# Patient Record
Sex: MALE | Employment: FULL TIME | URBAN - METROPOLITAN AREA
[De-identification: names, ages, dates, MRNs, and addresses within clinical notes are randomized per-mention and may not be internally consistent; named-entity substitution may affect disease eponyms.]

---

## 2020-12-18 ENCOUNTER — OFFICE VISIT (OUTPATIENT)
Dept: ENDOCRINOLOGY | Age: 50
End: 2020-12-18
Payer: COMMERCIAL

## 2020-12-18 ENCOUNTER — TELEPHONE (OUTPATIENT)
Dept: ENDOCRINOLOGY | Age: 50
End: 2020-12-18

## 2020-12-18 VITALS
WEIGHT: 242.3 LBS | TEMPERATURE: 98.9 F | OXYGEN SATURATION: 97 % | DIASTOLIC BLOOD PRESSURE: 91 MMHG | HEART RATE: 78 BPM | SYSTOLIC BLOOD PRESSURE: 140 MMHG

## 2020-12-18 DIAGNOSIS — B35.3 TINEA PEDIS OF BOTH FEET: ICD-10-CM

## 2020-12-18 DIAGNOSIS — E11.65 TYPE 2 DIABETES MELLITUS WITH HYPERGLYCEMIA, WITHOUT LONG-TERM CURRENT USE OF INSULIN (HCC): Primary | ICD-10-CM

## 2020-12-18 PROBLEM — E78.2 MIXED HYPERLIPIDEMIA: Status: ACTIVE | Noted: 2020-12-18

## 2020-12-18 PROBLEM — I10 BENIGN ESSENTIAL HTN: Status: ACTIVE | Noted: 2020-12-18

## 2020-12-18 LAB
BILIRUB UR QL STRIP: NEGATIVE
GLUCOSE POC: 368 MG/DL
GLUCOSE UR-MCNC: NORMAL MG/DL
HBA1C MFR BLD HPLC: 14 %
KETONES P FAST UR STRIP-MCNC: NEGATIVE MG/DL
PH UR STRIP: 6 [PH] (ref 4.6–8)
PROT UR QL STRIP: NEGATIVE
SP GR UR STRIP: 1.01 (ref 1–1.03)
UA UROBILINOGEN AMB POC: NORMAL (ref 0.2–1)
URINALYSIS CLARITY POC: CLEAR
URINALYSIS COLOR POC: YELLOW
URINE BLOOD POC: NEGATIVE
URINE LEUKOCYTES POC: NEGATIVE
URINE NITRITES POC: NEGATIVE

## 2020-12-18 PROCEDURE — 82962 GLUCOSE BLOOD TEST: CPT | Performed by: INTERNAL MEDICINE

## 2020-12-18 PROCEDURE — 99204 OFFICE O/P NEW MOD 45 MIN: CPT | Performed by: INTERNAL MEDICINE

## 2020-12-18 PROCEDURE — 81002 URINALYSIS NONAUTO W/O SCOPE: CPT | Performed by: INTERNAL MEDICINE

## 2020-12-18 PROCEDURE — 83036 HEMOGLOBIN GLYCOSYLATED A1C: CPT | Performed by: INTERNAL MEDICINE

## 2020-12-18 RX ORDER — METFORMIN HYDROCHLORIDE 500 MG/1
TABLET, FILM COATED, EXTENDED RELEASE ORAL
Qty: 60 TAB | Refills: 3 | Status: SHIPPED | OUTPATIENT
Start: 2020-12-18 | End: 2021-03-15

## 2020-12-18 RX ORDER — METFORMIN HYDROCHLORIDE 1000 MG/1
1000 TABLET ORAL 2 TIMES DAILY WITH MEALS
Qty: 60 TAB | Refills: 3 | Status: SHIPPED | OUTPATIENT
Start: 2020-12-18 | End: 2020-12-18 | Stop reason: ALTCHOICE

## 2020-12-18 RX ORDER — INSULIN PUMP SYRINGE, 3 ML
EACH MISCELLANEOUS
Qty: 1 KIT | Refills: 0 | Status: SHIPPED | OUTPATIENT
Start: 2020-12-18

## 2020-12-18 RX ORDER — ONDANSETRON 4 MG/1
4 TABLET, FILM COATED ORAL
COMMUNITY
End: 2021-02-16 | Stop reason: ALTCHOICE

## 2020-12-18 RX ORDER — LANCETS
EACH MISCELLANEOUS
Qty: 100 EACH | Refills: 5 | Status: SHIPPED | OUTPATIENT
Start: 2020-12-18

## 2020-12-18 RX ORDER — PIOGLITAZONEHYDROCHLORIDE 45 MG/1
45 TABLET ORAL DAILY
Qty: 30 TAB | Refills: 3 | Status: SHIPPED | OUTPATIENT
Start: 2020-12-18 | End: 2021-12-30 | Stop reason: SDUPTHER

## 2020-12-18 RX ORDER — PEN NEEDLE, DIABETIC 30 GX3/16"
NEEDLE, DISPOSABLE MISCELLANEOUS
Qty: 1 PACKAGE | Refills: 3 | Status: SHIPPED | OUTPATIENT
Start: 2020-12-18 | End: 2022-01-11 | Stop reason: SDUPTHER

## 2020-12-18 RX ORDER — INSULIN GLARGINE 100 [IU]/ML
INJECTION, SOLUTION SUBCUTANEOUS
Qty: 2 ADJUSTABLE DOSE PRE-FILLED PEN SYRINGE | Refills: 3 | Status: SHIPPED | OUTPATIENT
Start: 2020-12-18 | End: 2020-12-22 | Stop reason: ALTCHOICE

## 2020-12-18 RX ORDER — METFORMIN HYDROCHLORIDE 500 MG/1
500 TABLET ORAL 2 TIMES DAILY WITH MEALS
COMMUNITY
End: 2020-12-18 | Stop reason: DRUGHIGH

## 2020-12-18 NOTE — LETTER
12/18/2020 Patient: Linda Dubois YOB: 1970 Date of Visit: 12/18/2020 Leonardo Portillo MD 
[id number] [facility] [address] Via Fax: [phone] Dear Leonardo Portillo MD, Thank you for referring Mr. Alexandra Vazquez to [address] [place] [address] for evaluation. My notes for this consultation are attached. If you have questions, please do not hesitate to call me. I look forward to following your patient along with you. Sincerely, Christine Ramirez MD

## 2020-12-18 NOTE — TELEPHONE ENCOUNTER
Called and spoke to patient's mother. 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

## 2020-12-18 NOTE — TELEPHONE ENCOUNTER
Patient called in requesting to see if you could call and  talk to his mother and let her know what is going on with him because he couldn't explain it to her for her to understand exactly what is going on and he think he left out some information.  His mother's name is Moreno Hdez and her number is [phone]

## 2020-12-18 NOTE — PROGRESS NOTES
History and Physical    Patient: Whit Herbert MRN: [id number]  SSN: [id number]    YOB: 1970  Age: 44 y.o. Sex: male      Subjective:      Whit Herbert is a 44 y.o. male medical history of hypertension, hyperlipidemia is sent to me by primary care physician Dr. German Coleman for type 2 diabetes mellitus. Patient was diagnosed with diabetes a long time back. He is on metformin 1000 mg twice a day. He skips it here and there because it gives him diarrhea. He eats 2 meals per day, snacks on sweets, sometimes drinks soda and sweet tea. Does not do any structured exercise. Physically active at work. He has not had diabetic eye exam in a long time. He is having polyuria, polydipsia, unintentional weight loss. He has not been checking blood glucose at home and he needs a new glucometer. Glucometer reading: Does not check blood glucose at home    · Diagnosis: long time  · Current treatment: metformin 100 mg bid  · Past treatment: no  · Glucose checks: not checking   · Hyperglycemia: yes  · Hypoglycemia: no  · Meals per day: 2, breakfast: hardy's, lunch: skips, dinner: baked chicken, snacks: sweets, sod, sweet tea  · Exercise: walks at work  · DM related hospitalizations: no    Complications of DM:  · CAD: no, a.fib no cardiologist  · CVA: no  · PVD: no  · Amputations: no   · Retinopathy: no; last exam was 2 years back  · Gastropathy: no  · Nephropathy: no  · Neuropathy: no    Medications:  · Statin:  No  · ACE-I:  No  · ASA:  Yes    · Diabetes education: No      Past Medical History:   Diagnosis Date    [facility]      Past Surgical History:   Procedure Laterality Date    HX APPENDECTOMY        Family History   Family history unknown: Yes     Social History     Tobacco Use    Smoking status: Never Smoker    Smokeless tobacco: Never Used   Substance Use Topics    Alcohol use: Not on file      Prior to Admission medications    Medication Sig Start Date End Date Taking? Authorizing Provider   AMLODIPINE BESYLATE, BULK, 10 mg by Does Not Apply route daily. Yes Provider, Historical   ondansetron hcl (ZOFRAN) 4 mg tablet Take 4 mg by mouth every eight (8) hours as needed for Nausea or Vomiting. Yes Provider, Historical   insulin glargine (LANTUS,BASAGLAR) 100 unit/mL (3 mL) inpn Inject 15 units at bedtime 12/18/20  Yes Rachell Campo MD   pioglitazone (ACTOS) 45 mg tablet Take 1 Tab by mouth daily for 30 days. 12/18/20 1/17/21 Yes Rachell Campo MD   Insulin Needles, Disposable, 31 gauge x 5/16\" ndle Once a day 12/18/20  Yes Rachell Campo MD   Blood-Glucose Meter monitoring kit Check glucose twice a day 12/18/20  Yes Rachell Campo MD   glucose blood VI test strips (ASCENSIA AUTODISC VI, ONE TOUCH ULTRA TEST VI) strip Check glucose twice a day 12/18/20  Yes Rachell Campo MD   lancets misc Check glucose twice a day 12/18/20  Yes Rachell Campo MD   [facility]) 500 mg TG24 24 hour tablet Take 2 tabs with dinner 12/18/20  Yes Rachell Campo MD        No Known Allergies    Review of Systems:  ROS    A comprehensive review of systems was preformed and it is negative except mentioned in HPI    Objective:     Vitals:    12/18/20 1123   BP: (!) 140/91   Pulse: 78   Temp: 98.9 °F (37.2 °C)   SpO2: 97%   Weight: 242 lb 4.8 oz (109.9 kg)        Physical Exam:    Physical Exam  Vitals signs and nursing note reviewed. Constitutional:       Appearance: Normal appearance. HENT:      Head: Normocephalic and atraumatic. Eyes:      Extraocular Movements: Extraocular movements intact. Pupils: Pupils are equal, round, and reactive to light. Neck:      Musculoskeletal: Neck supple. Cardiovascular:      Rate and Rhythm: Normal rate and regular rhythm. Pulmonary:      Effort: Pulmonary effort is normal.      Breath sounds: Normal breath sounds. Abdominal:      General: Bowel sounds are normal.      Palpations: Abdomen is soft. Musculoskeletal: Normal range of motion. General: No swelling. Skin:     General: Skin is warm and dry. Neurological:      General: No focal deficit present. Mental Status: He is alert and oriented to person, place, and time. Psychiatric:         Mood and Affect: Mood normal.         Behavior: Behavior normal.       diabetic foot exam:  Bilateral diabetic foot exam was performed today. Dorsalis pedis pulses 2+ bilaterally. Monofilament sensation normal bilaterally. No ulcers or skin breakdown. Labs and Imaging:    Last 3 Recorded Weights in this Encounter    12/18/20 1123   Weight: 242 lb 4.8 oz (109.9 kg)        No results found for: HBA1C, HGBE8, JAN6NVTA, QHS7CEBN, [id number]     Assessment:     Patient Active Problem List   Diagnosis Code    Type 2 diabetes mellitus with hyperglycemia, without long-term current use of insulin ([facility]) E11.65    Tinea pedis of both feet B35.3    Benign essential HTN I10    Mixed hyperlipidemia E78.2           Plan:     Type 2 diabetes mellitus, uncontrolled:  I reviewed progress note and labs from the referring provider's office. Hemoglobin A1c is more than 14% today. Fingerstick blood glucose is 368 mg/dL in my office today. Urine negative for ketones  Up to date with diabetes related annual labs: no  Up to date with diabetic eye exam: no  Plan:  Discussed with patient at length about importance of controlling diabetes to prevent endorgan damage. Discussed about diabetic diet, decreasing starch intake, avoiding sugary beverages, healthy snacking. I will start him on Lantus 15 units at bedtime. Start pioglitazone 45 mg daily. Switch from regular Metformin to Metformin  mg 2 tablets with dinner only. Check blood glucose twice a day and I will see him back in my office in 2 months with his glucometer. Refer patient for diabetic eye exam.    Essential hypertension:  Blood pressure is elevated today as patient did not take amlodipine today.   Check urine microalbumin/creatinine ratio today.    Mixed hyperlipidemia:  Patient is not on any cholesterol medications. Labs from December 2019 showed hypercholesterolemia. Check lipid profile today. Tinea pedis:  Refer patient to podiatry. Orders Placed This Encounter    LIPID PANEL    METABOLIC PANEL, COMPREHENSIVE    MICROALBUMIN, UR, RAND W/ MICROALB/CREAT RATIO    TSH RFX ON ABNORMAL TO FREE T4    REFERRAL TO OPHTHALMOLOGY     Referral Priority:   Routine     Referral Type:   Consultation     Referral Reason:   Specialty Services Required     Referred to Provider:   Iva Palumbo MD     Number of Visits Requested:   1    REFERRAL TO PODIATRY     Referral Priority:   Routine     Referral Type:   Consultation     Referral Reason:   Specialty Services Required     Referred to Provider:   Kimberly Hernandez DPM     Number of Visits Requested:   1    AMB POC GLUCOSE BLOOD, BY GLUCOSE MONITORING DEVICE    AMB POC HEMOGLOBIN A1C    AMB POC URINALYSIS DIP STICK MANUAL W/O MICRO    insulin glargine (LANTUS,BASAGLAR) 100 unit/mL (3 mL) inpn     Sig: Inject 15 units at bedtime     Dispense:  2 Adjustable Dose Pre-filled Pen Syringe     Refill:  3    DISCONTD: metFORMIN (GLUCOPHAGE) 1,000 mg tablet     Sig: Take 1 Tab by mouth two (2) times daily (with meals) for 30 days. Dispense:  60 Tab     Refill:  3    pioglitazone (ACTOS) 45 mg tablet     Sig: Take 1 Tab by mouth daily for 30 days.      Dispense:  30 Tab     Refill:  3    Insulin Needles, Disposable, 31 gauge x 5/16\" ndle     Sig: Once a day     Dispense:  1 Package     Refill:  3    Blood-Glucose Meter monitoring kit     Sig: Check glucose twice a day     Dispense:  1 Kit     Refill:  0    glucose blood VI test strips (ASCENSIA AUTODISC VI, ONE TOUCH ULTRA TEST VI) strip     Sig: Check glucose twice a day     Dispense:  100 Strip     Refill:  3    lancets misc     Sig: Check glucose twice a day     Dispense:  100 Each     Refill:  5    metFORMIN (GLUMETZA ER) 500 mg TG24 24 hour tablet     Sig: Take 2 tabs with dinner     Dispense:  60 Tab     Refill:  3     Please DC metformin 100 mg bid prescription sent earlier        Signed By: Brenna Damon MD     December 18, 2020      Return to clinic 2 months

## 2020-12-18 NOTE — PATIENT INSTRUCTIONS
Continue metformin 1000 mg twice a day with food. Start Pioglitazone 45 mg daily in the morning. Start Insulin Lantus 15 units at bedtime. Check glucose twice a day: before breakfast and before dinner. Labs today. Bring meter to next visit.

## 2020-12-19 LAB
ALBUMIN SERPL-MCNC: 4.1 G/DL (ref 3.8–4.9)
ALBUMIN/CREAT UR: 82 MG/G CREAT (ref 0–29)
ALBUMIN/GLOB SERPL: 1.1 {RATIO} (ref 1.2–2.2)
ALP SERPL-CCNC: 115 IU/L (ref 39–117)
ALT SERPL-CCNC: 24 IU/L (ref 0–44)
AST SERPL-CCNC: 22 IU/L (ref 0–40)
BILIRUB SERPL-MCNC: 0.5 MG/DL (ref 0–1.2)
BUN SERPL-MCNC: 8 MG/DL (ref 6–24)
BUN/CREAT SERPL: 9 (ref 9–20)
CALCIUM SERPL-MCNC: 9.5 MG/DL (ref 8.7–10.2)
CHLORIDE SERPL-SCNC: 98 MMOL/L (ref 96–106)
CHOLEST SERPL-MCNC: 202 MG/DL (ref 100–199)
CO2 SERPL-SCNC: 23 MMOL/L (ref 20–29)
CREAT SERPL-MCNC: 0.85 MG/DL (ref 0.76–1.27)
CREAT UR-MCNC: 68.8 MG/DL
GLOBULIN SER CALC-MCNC: 3.7 G/DL (ref 1.5–4.5)
GLUCOSE SERPL-MCNC: 369 MG/DL (ref 65–99)
HDLC SERPL-MCNC: 58 MG/DL
LDLC SERPL CALC-MCNC: 128 MG/DL (ref 0–99)
MICROALBUMIN UR-MCNC: 56.3 UG/ML
POTASSIUM SERPL-SCNC: 4.4 MMOL/L (ref 3.5–5.2)
PROT SERPL-MCNC: 7.8 G/DL (ref 6–8.5)
SODIUM SERPL-SCNC: 137 MMOL/L (ref 134–144)
TRIGL SERPL-MCNC: 89 MG/DL (ref 0–149)
TSH SERPL DL<=0.005 MIU/L-ACNC: 0.7 UIU/ML (ref 0.45–4.5)
VLDLC SERPL CALC-MCNC: 16 MG/DL (ref 5–40)

## 2020-12-21 ENCOUNTER — TELEPHONE (OUTPATIENT)
Dept: ENDOCRINOLOGY | Age: 50
End: 2020-12-21

## 2020-12-21 DIAGNOSIS — E11.65 TYPE 2 DIABETES MELLITUS WITH HYPERGLYCEMIA, WITHOUT LONG-TERM CURRENT USE OF INSULIN (HCC): Primary | ICD-10-CM

## 2020-12-22 ENCOUNTER — TELEPHONE (OUTPATIENT)
Dept: ENDOCRINOLOGY | Age: 50
End: 2020-12-22

## 2020-12-22 DIAGNOSIS — E78.2 MIXED HYPERLIPIDEMIA: Primary | ICD-10-CM

## 2020-12-22 RX ORDER — ATORVASTATIN CALCIUM 10 MG/1
10 TABLET, FILM COATED ORAL DAILY
Qty: 30 TAB | Refills: 3 | Status: SHIPPED | OUTPATIENT
Start: 2020-12-22 | End: 2021-03-19

## 2020-12-22 RX ORDER — INSULIN GLARGINE 100 [IU]/ML
INJECTION, SOLUTION SUBCUTANEOUS
Qty: 2 ADJUSTABLE DOSE PRE-FILLED PEN SYRINGE | Refills: 3 | Status: SHIPPED | OUTPATIENT
Start: 2020-12-22 | End: 2022-01-24 | Stop reason: ALTCHOICE

## 2020-12-22 NOTE — PROGRESS NOTES
Please inform patient about the following: Cholesterol is high, bad cholesterol is 128, it should be less than 70 with diabetes for heart protection. I am going to start him on a medication called atorvastatin 10 mg to be taken at bedtime. Normal electrolytes, kidney and liver function test.  Normal thyroid function test.  Urine test is showing that he leaks microscopic protein, meaning there is beginning of damage to his kidneys from diabetes. We need to work on diabetes and blood pressure control to prevent progression of kidney damage. We will talk more about this at next visit.

## 2020-12-22 NOTE — TELEPHONE ENCOUNTER
Left patient a vm asking to give the office a call back in regards to labs    ----- Message from Marcelino Mendoza MD sent at 12/22/2020 12:53 PM EST -----  Please inform patient about the following: Cholesterol is high, bad cholesterol is 128, it should be less than 70 with diabetes for heart protection. I am going to start him on a medication called atorvastatin 10 mg to be taken at bedtime. Normal electrolytes, kidney and liver function test.  Normal thyroid function test.  Urine test is showing that he leaks microscopic protein, meaning there is beginning of damage to his kidneys from diabetes. We need to work on diabetes and blood pressure control to prevent progression of kidney damage. We will talk more about this at next visit.

## 2021-01-04 ENCOUNTER — TELEPHONE (OUTPATIENT)
Dept: ENDOCRINOLOGY | Age: 51
End: 2021-01-04

## 2021-01-04 NOTE — TELEPHONE ENCOUNTER
Left patient a vm asking to give the office a call back in regards to results    ----- Message from Lui Harrington MD sent at 12/22/2020 12:53 PM EST -----  Please inform patient about the following: Cholesterol is high, bad cholesterol is 128, it should be less than 70 with diabetes for heart protection. I am going to start him on a medication called atorvastatin 10 mg to be taken at bedtime. Normal electrolytes, kidney and liver function test.  Normal thyroid function test.  Urine test is showing that he leaks microscopic protein, meaning there is beginning of damage to his kidneys from diabetes. We need to work on diabetes and blood pressure control to prevent progression of kidney damage. We will talk more about this at next visit.

## 2021-01-26 ENCOUNTER — TELEPHONE (OUTPATIENT)
Dept: ENDOCRINOLOGY | Age: 51
End: 2021-01-26

## 2021-01-26 NOTE — TELEPHONE ENCOUNTER
Patient notified    ----- Message from Kameron Gruber MD sent at 12/22/2020 12:53 PM EST -----  Please inform patient about the following: Cholesterol is high, bad cholesterol is 128, it should be less than 70 with diabetes for heart protection. I am going to start him on a medication called atorvastatin 10 mg to be taken at bedtime. Normal electrolytes, kidney and liver function test.  Normal thyroid function test.  Urine test is showing that he leaks microscopic protein, meaning there is beginning of damage to his kidneys from diabetes. We need to work on diabetes and blood pressure control to prevent progression of kidney damage. We will talk more about this at next visit.

## 2021-02-16 ENCOUNTER — OFFICE VISIT (OUTPATIENT)
Dept: PODIATRY | Age: 51
End: 2021-02-16
Payer: COMMERCIAL

## 2021-02-16 VITALS
WEIGHT: 251.4 LBS | DIASTOLIC BLOOD PRESSURE: 99 MMHG | BODY MASS INDEX: 31.26 KG/M2 | SYSTOLIC BLOOD PRESSURE: 166 MMHG | TEMPERATURE: 97.3 F | HEIGHT: 75 IN | HEART RATE: 100 BPM | OXYGEN SATURATION: 98 %

## 2021-02-16 DIAGNOSIS — E11.42 DIABETIC POLYNEUROPATHY ASSOCIATED WITH TYPE 2 DIABETES MELLITUS (HCC): ICD-10-CM

## 2021-02-16 DIAGNOSIS — E11.65 TYPE 2 DIABETES MELLITUS WITH HYPERGLYCEMIA, WITHOUT LONG-TERM CURRENT USE OF INSULIN (HCC): Primary | ICD-10-CM

## 2021-02-16 DIAGNOSIS — B35.3 TINEA PEDIS OF BOTH FEET: ICD-10-CM

## 2021-02-16 DIAGNOSIS — B35.1 ONYCHOMYCOSIS: ICD-10-CM

## 2021-02-16 PROCEDURE — 11721 DEBRIDE NAIL 6 OR MORE: CPT | Performed by: PODIATRIST

## 2021-02-16 PROCEDURE — 99204 OFFICE O/P NEW MOD 45 MIN: CPT | Performed by: PODIATRIST

## 2021-02-16 PROCEDURE — 11755 BIOPSY NAIL UNIT: CPT | Performed by: PODIATRIST

## 2021-02-16 RX ORDER — DULOXETIN HYDROCHLORIDE 60 MG/1
60 CAPSULE, DELAYED RELEASE ORAL DAILY
Qty: 30 CAP | Refills: 2 | Status: SHIPPED | OUTPATIENT
Start: 2021-02-16 | End: 2022-05-02

## 2021-02-16 RX ORDER — CLOTRIMAZOLE AND BETAMETHASONE DIPROPIONATE 10; .64 MG/G; MG/G
CREAM TOPICAL 2 TIMES DAILY
Qty: 45 G | Refills: 0 | Status: SHIPPED | OUTPATIENT
Start: 2021-02-16

## 2021-02-22 NOTE — PROGRESS NOTES
[facility]    Subjective:         Patient is a 44 y.o. male who is being seen as a new pt for a diabetic pedal exam with multiple acute complaints. Patient states he was referred to our office by his endocrinologist.  He states his diabetes is currently out of control but he has made recent changes with his endocrinologist for better management. He denies any overt pedal pain due to his diabetic peripheral neuropathy. He states he does have an itchy rash on the plantar aspect of his feet. He also states his toenails are thick/discolored but denies ever having testing performed to confirm a diagnosis. He states he has burning/tingling in his feet. He states this burning/tingling is worse in the evenings after working all day. He denies any recent pedal trauma. He denies any systemic signs of infection. He denies any other pedal complaints    Past Medical History:   Diagnosis Date    Diabetes [facility])      Past Surgical History:   Procedure Laterality Date    HX APPENDECTOMY         Family History   Family history unknown: Yes      Social History     Tobacco Use    Smoking status: Never Smoker    Smokeless tobacco: Never Used   Substance Use Topics    Alcohol use: Not Currently     No Known Allergies  Prior to Admission medications    Medication Sig Start Date End Date Taking? Authorizing Provider   clotrimazole-betamethasone (LOTRISONE) topical cream Apply  to affected area two (2) times a day. 2/16/21  Yes Mayito Srinivasan DPM   DULoxetine (CYMBALTA) 60 mg capsule Take 1 Cap by mouth daily. 2/16/21  Yes Olga Lanier DPM   insulin glargine (Basaglar KwikPen U-100 Insulin) 100 unit/mL (3 mL) inpn Inject 15 units daily at bedtime 12/22/20  Yes Francesca Santiago MD   AMLODIPINE BESYLATE, BULK, 10 mg by Does Not Apply route daily.    Yes Provider, Historical   Insulin Needles, Disposable, 31 gauge x 5/16\" ndle Once a day 12/18/20  Yes Francesca Santiago MD   Blood-Glucose Meter monitoring kit Check glucose twice a day 12/18/20  Yes Alyse Watkins MD   glucose blood VI test strips (ASCENSIA AUTODISC VI, ONE TOUCH ULTRA TEST VI) strip Check glucose twice a day 12/18/20  Yes Alyse Watkins MD   lancets misc Check glucose twice a day 12/18/20  Yes Alyse Watkins MD   [facility]) 500 mg TG24 24 hour tablet Take 2 tabs with dinner 12/18/20  Yes Alyse Watkins MD       Review of Systems   Constitutional: Negative. HENT: Negative. Eyes: Negative. Respiratory: Negative. Cardiovascular: Negative. Gastrointestinal: Negative. Endocrine: Negative. Genitourinary: Negative. Musculoskeletal: Negative. Skin: Positive for rash. Allergic/Immunologic: Positive for immunocompromised state. Neurological: Positive for numbness. Hematological: Negative. Psychiatric/Behavioral: Negative. All other systems reviewed and are negative. Objective:     Visit Vitals  BP (!) 166/99 (BP 1 Location: Left upper arm, BP Patient Position: Sitting, BP Cuff Size: Large adult)   Pulse 100   Temp 97.3 °F (36.3 °C) (Temporal)   Ht 6' 3\" (1.905 m)   Wt 251 lb 6.4 oz (114 kg)   SpO2 98%   BMI 31.42 kg/m²       Physical Exam  Vitals signs reviewed. Constitutional:       Appearance: He is obese. Cardiovascular:      Pulses:           Dorsalis pedis pulses are 2+ on the right side and 2+ on the left side. Posterior tibial pulses are 2+ on the right side and 2+ on the left side. Pulmonary:      Effort: Pulmonary effort is normal.   Musculoskeletal:      Right lower leg: No edema. Left lower leg: No edema. Right foot: Normal range of motion. No deformity or bunion. Left foot: Normal range of motion. No deformity or bunion. Feet:      Right foot:      Protective Sensation: 10 sites tested. 0 sites sensed. Skin integrity: Dry skin present. Toenail Condition: Right toenails are abnormally thick. Fungal disease present.      Left foot:      Protective Sensation: 10 sites tested. 0 sites sensed. Skin integrity: Dry skin present. Toenail Condition: Left toenails are abnormally thick. Fungal disease present. Lymphadenopathy:      Lower Body: No right inguinal adenopathy. No left inguinal adenopathy. Skin:     General: Skin is warm. Capillary Refill: Capillary refill takes 2 to 3 seconds. Neurological:      Mental Status: He is alert and oriented to person, place, and time. Psychiatric:         Mood and Affect: Mood and affect normal.         Behavior: Behavior is cooperative. Data Review: No results found for this or any previous visit (from the past 24 hour(s)). Impression:       ICD-10-CM ICD-9-CM    1. Type 2 diabetes mellitus with hyperglycemia, without long-term current use of insulin ([facility])  E11.65 250.00      790.29    2. Tinea pedis of both feet  B35.3 110.4    3. Onychomycosis  B35.1 110.1 BIOPSY, NAIL UNIT   4. Diabetic polyneuropathy associated with type 2 diabetes mellitus ([facility])  E11.42 250.60      357.2          Recommendation:     Patient seen and evaluated in the office  Discussed and educated patient regarding their current medical condition. Instructed patient to monitor their feet daily, be compliant with all medications and wear supportive shoe gear. A sharp toenail plate debridement was performed to all 10 pedal digits with a nail nipper without incident. Patient tolerated well no dressings needed  A toenail plate biopsy was taken of the right hallux with a nail nipper to confirm/deny the presence of fungal/yeast elements. Patient tolerated well no dressing was needed.   Instructed patient that when pathology is old return, will discuss treatment options in more depth  A prescription was given for a topical steroid/antifungal cream to be applied twice daily for 2 weeks for symptomatic relief of his tinea pedis  An in-depth conversation was had regarding his diabetic peripheral neuropathy, a prescription was given for Cymbalta 60 mg to be taken daily

## 2021-03-13 DIAGNOSIS — E11.65 TYPE 2 DIABETES MELLITUS WITH HYPERGLYCEMIA, WITHOUT LONG-TERM CURRENT USE OF INSULIN (HCC): ICD-10-CM

## 2021-03-15 RX ORDER — METFORMIN HYDROCHLORIDE 500 MG/1
TABLET, EXTENDED RELEASE ORAL
Qty: 180 TAB | Refills: 1 | Status: SHIPPED | OUTPATIENT
Start: 2021-03-15

## 2021-03-19 DIAGNOSIS — E78.2 MIXED HYPERLIPIDEMIA: ICD-10-CM

## 2021-03-19 RX ORDER — ATORVASTATIN CALCIUM 10 MG/1
TABLET, FILM COATED ORAL
Qty: 90 TAB | Refills: 1 | Status: SHIPPED | OUTPATIENT
Start: 2021-03-19 | End: 2021-12-30 | Stop reason: SDUPTHER

## 2021-12-29 ENCOUNTER — TELEPHONE (OUTPATIENT)
Dept: ENDOCRINOLOGY | Age: 51
End: 2021-12-29

## 2021-12-29 DIAGNOSIS — E78.2 MIXED HYPERLIPIDEMIA: ICD-10-CM

## 2021-12-29 DIAGNOSIS — E11.65 TYPE 2 DIABETES MELLITUS WITH HYPERGLYCEMIA, WITHOUT LONG-TERM CURRENT USE OF INSULIN (HCC): ICD-10-CM

## 2021-12-30 RX ORDER — PIOGLITAZONEHYDROCHLORIDE 45 MG/1
45 TABLET ORAL DAILY
Qty: 90 TABLET | Refills: 2 | Status: SHIPPED | OUTPATIENT
Start: 2021-12-30 | End: 2022-03-30

## 2021-12-30 RX ORDER — ATORVASTATIN CALCIUM 10 MG/1
10 TABLET, FILM COATED ORAL DAILY
Qty: 90 TABLET | Refills: 2 | Status: SHIPPED | OUTPATIENT
Start: 2021-12-30 | End: 2022-01-11 | Stop reason: DRUGHIGH

## 2022-01-11 ENCOUNTER — OFFICE VISIT (OUTPATIENT)
Dept: ENDOCRINOLOGY | Age: 52
End: 2022-01-11
Payer: COMMERCIAL

## 2022-01-11 VITALS
HEIGHT: 75 IN | OXYGEN SATURATION: 99 % | WEIGHT: 251.5 LBS | TEMPERATURE: 98.7 F | HEART RATE: 79 BPM | DIASTOLIC BLOOD PRESSURE: 95 MMHG | SYSTOLIC BLOOD PRESSURE: 148 MMHG | BODY MASS INDEX: 31.27 KG/M2

## 2022-01-11 DIAGNOSIS — I10 BENIGN ESSENTIAL HTN: ICD-10-CM

## 2022-01-11 DIAGNOSIS — Z79.4 TYPE 2 DIABETES MELLITUS WITH HYPERGLYCEMIA, WITH LONG-TERM CURRENT USE OF INSULIN (HCC): ICD-10-CM

## 2022-01-11 DIAGNOSIS — E11.65 UNCONTROLLED TYPE 2 DIABETES MELLITUS WITH HYPERGLYCEMIA (HCC): Primary | ICD-10-CM

## 2022-01-11 DIAGNOSIS — E11.65 TYPE 2 DIABETES MELLITUS WITH HYPERGLYCEMIA, WITHOUT LONG-TERM CURRENT USE OF INSULIN (HCC): ICD-10-CM

## 2022-01-11 DIAGNOSIS — E11.65 TYPE 2 DIABETES MELLITUS WITH HYPERGLYCEMIA, WITH LONG-TERM CURRENT USE OF INSULIN (HCC): ICD-10-CM

## 2022-01-11 DIAGNOSIS — E78.2 MIXED HYPERLIPIDEMIA: ICD-10-CM

## 2022-01-11 LAB — GLUCOSE POC: 195 MG/DL

## 2022-01-11 PROCEDURE — 99214 OFFICE O/P EST MOD 30 MIN: CPT | Performed by: INTERNAL MEDICINE

## 2022-01-11 PROCEDURE — 82962 GLUCOSE BLOOD TEST: CPT | Performed by: INTERNAL MEDICINE

## 2022-01-11 RX ORDER — METFORMIN HYDROCHLORIDE 500 MG/1
TABLET, EXTENDED RELEASE ORAL
Qty: 180 TABLET | Refills: 2 | Status: SHIPPED | OUTPATIENT
Start: 2022-01-11

## 2022-01-11 RX ORDER — ATORVASTATIN CALCIUM 20 MG/1
20 TABLET, FILM COATED ORAL
Qty: 90 TABLET | Refills: 2 | Status: SHIPPED | OUTPATIENT
Start: 2022-01-11 | End: 2022-04-11

## 2022-01-11 RX ORDER — LISINOPRIL 5 MG/1
TABLET ORAL
COMMUNITY
End: 2022-01-11 | Stop reason: DRUGHIGH

## 2022-01-11 RX ORDER — AMLODIPINE BESYLATE 10 MG/1
TABLET ORAL
COMMUNITY
Start: 2021-12-23

## 2022-01-11 RX ORDER — LISINOPRIL 10 MG/1
10 TABLET ORAL DAILY
Qty: 90 TABLET | Refills: 2 | Status: SHIPPED | OUTPATIENT
Start: 2022-01-11 | End: 2022-04-11

## 2022-01-11 RX ORDER — INSULIN GLARGINE 100 [IU]/ML
INJECTION, SOLUTION SUBCUTANEOUS
Qty: 27 ML | Refills: 2 | Status: SHIPPED | OUTPATIENT
Start: 2022-01-11 | End: 2022-01-24 | Stop reason: ALTCHOICE

## 2022-01-11 RX ORDER — PIOGLITAZONEHYDROCHLORIDE 45 MG/1
45 TABLET ORAL DAILY
Qty: 90 TABLET | Refills: 2 | Status: SHIPPED | OUTPATIENT
Start: 2022-01-11 | End: 2022-04-11

## 2022-01-11 RX ORDER — PEN NEEDLE, DIABETIC 30 GX3/16"
NEEDLE, DISPOSABLE MISCELLANEOUS
Qty: 100 EACH | Refills: 2 | Status: SHIPPED | OUTPATIENT
Start: 2022-01-11

## 2022-01-11 RX ORDER — BLOOD SUGAR DIAGNOSTIC
STRIP MISCELLANEOUS
Qty: 300 STRIP | Refills: 3 | Status: SHIPPED | OUTPATIENT
Start: 2022-01-11

## 2022-01-11 RX ORDER — LANCETS
EACH MISCELLANEOUS
Qty: 300 EACH | Refills: 3 | Status: SHIPPED | OUTPATIENT
Start: 2022-01-11

## 2022-01-11 NOTE — PROGRESS NOTES
History and Physical    Patient: Ricky De La Paz MRN: [id number]  SSN: [id number]    YOB: 1970  Age: 46 y.o. Sex: male      Subjective:      Ricky De La Paz is a 46 y.o. male medical history of hypertension, hyperlipidemia is here for follow-up of type 2 diabetes mellitus. He was sent to me by primary care physician Dr. Suri Lee. This patient was seen only once, in December 2020 and after that he did not come for follow-up appointment until he started to run out of his medications. At the last visit I switched him from metformin 1000 mg twice a day to metformin  mg 2 tablets with dinner, he was started on pioglitazone 45 mg daily and Lantus 15 units at bedtime. He ran out of his medications a few months back, restarted just a couple weeks back. He is not drinking soda anymore, still drinks some diet tea, may eat candies, cookies, etc.  Denies any hypoglycemia. He had diabetic eye exam at [facility] sometime last year, unable to give me details but he tells me that his retina was normal.  He is describing some blurry vision, glare in his eyes when he drives at night as well as erectile dysfunction which is the main thing that got him concerned and he is presenting here today. He was also started on atorvastatin 10 mg after the last visit. He is taking it regularly, not sure if he is taking it at night or day. Glucometer reading: Checking blood glucose twice a day.   Readings are ranging from 181 to 316 mg/dL    Updated diabetes history:  · Diagnosis: long time  · Current treatment: metformin  mg 2 tablets with dinner, pioglitazone 45 mg daily, Lantus 15 units at bedtime  · Past treatment: no  · Glucose checks: not checking   · Hyperglycemia: yes  · Hypoglycemia: no  · Meals per day: 2, breakfast: hardy's, lunch: skips, dinner: baked chicken, snacks: sweets, sod, sweet tea  · Exercise: walks at work  · DM related hospitalizations: no    Complications of DM:  · CAD: no, a.fib no cardiologist  · CVA: no  · PVD: no  · Amputations: no   · Retinopathy: no; last exam was sometime in 2021  · Gastropathy: no  · Nephropathy: no  · Neuropathy: no    Medications:  · Statin:  Atorvastatin 10 mg daily  · ACE-I:  Lisinopril 5 mg  · ASA:  Yes    · Diabetes education: No      Past Medical History:   Diagnosis Date    Diabetes ([facility] [address]      Past Surgical History:   Procedure Laterality Date    HX APPENDECTOMY        Family History   Family history unknown: Yes     Social History     Tobacco Use    Smoking status: Never Smoker    Smokeless tobacco: Never Used   Substance Use Topics    Alcohol use: Not Currently      Prior to Admission medications    Medication Sig Start Date End Date Taking? Authorizing Provider   amLODIPine (NORVASC) 10 mg tablet  12/23/21  Yes Provider, Historical   atorvastatin (LIPITOR) 20 mg tablet Take 1 Tablet by mouth nightly for 90 days. 1/11/22 4/11/22 Yes Lisa Green MD   lisinopriL (PRINIVIL, ZESTRIL) 10 mg tablet Take 1 Tablet by mouth daily for 90 days. 1/11/22 4/11/22 Yes Lisa Green MD   insulin glargine (LANTUS,BASAGLAR) 100 unit/mL (3 mL) inpn Inject 25 units at bedtime, 90 days 1/11/22  Yes Lisa Green MD   Insulin Needles, Disposable, 31 gauge x 5/16\" ndle Once a day 1/11/22  Yes Lisa Green MD   metFORMIN ER (GLUCOPHAGE XR) 500 mg tablet 2 tabs with dinner, 90 days 1/11/22  Yes Lisa Green MD   pioglitazone (ACTOS) 45 mg tablet Take 1 Tablet by mouth daily for 90 days. 1/11/22 4/11/22 Yes Lisa Green MD   glucose blood VI test strips (Accu-Chek Dayna Plus test strp) strip Check glucose twice a day, 90 days 1/11/22  Yes Lisa Green MD   lancets (Accu-Chek Softclix Lancets) misc Check glucose twice a day, 90 days 1/11/22  Yes Lisa Green MD   pioglitazone (ACTOS) 45 mg tablet Take 1 Tablet by mouth daily for 90 days.  12/30/21 3/30/22 Yes Lisa Green MD   metFORMIN ER (GLUCOPHAGE XR) 500 mg tablet TAKE 2 TABLETS BY MOUTH ONCE DAILY WITH DINNER 3/15/21  Yes Robert Gunter MD   clotrimazole-betamethasone (LOTRISONE) topical cream Apply  to affected area two (2) times a day. 2/16/21  Yes Chris Srinivasan DPM   DULoxetine (CYMBALTA) 60 mg capsule Take 1 Cap by mouth daily. 2/16/21  Yes Chris Srinivasan DPM   insulin glargine (Basaglar KwikPen U-100 Insulin) 100 unit/mL (3 mL) inpn Inject 15 units daily at bedtime 12/22/20  Yes Robert Gunter MD   Blood-Glucose Meter monitoring kit Check glucose twice a day 12/18/20  Yes Robert Gunter MD   glucose blood VI test strips (ASCENSIA AUTODISC VI, ONE TOUCH ULTRA TEST VI) strip Check glucose twice a day 12/18/20  Yes Robert Gunter MD   lancets misc Check glucose twice a day 12/18/20  Yes Robert Gunter MD        No Known Allergies    Review of Systems:  ROS    A comprehensive review of systems was preformed and it is negative except mentioned in HPI    Objective:     Vitals:    01/11/22 0934 01/11/22 0939   BP: (!) 153/94 (!) 148/95   Pulse: 76 79   Temp: 98.7 °F (37.1 °C)    TempSrc: Temporal    SpO2: 99%    Weight: 251 lb 8 oz (114.1 kg)    Height: 6' 3\" (1.905 m)         Physical Exam:    Physical Exam  Vitals and nursing note reviewed. Constitutional:       Appearance: Normal appearance. HENT:      Head: Normocephalic and atraumatic. Cardiovascular:      Rate and Rhythm: Normal rate and regular rhythm. Pulmonary:      Effort: Pulmonary effort is normal.      Breath sounds: Normal breath sounds. Neurological:      Mental Status: He is alert. diabetic foot exam:  Bilateral diabetic foot exam was performed today. Dorsalis pedis pulses 1+ bilaterally. Monofilament sensation normal bilaterally.     Maceration left fourth interdigital space     Labs and Imaging:    Last 3 Recorded Weights in this Encounter    01/11/22 0934   Weight: 251 lb 8 oz (114.1 kg)        No results found for: HBA1C, VQC1FYYY, QBI8CTRT, KYU0IQFS     Assessment:     Patient Active Problem List   Diagnosis Code    Type 2 diabetes mellitus with hyperglycemia, without long-term current use of insulin ([facility]) E11.65    Tinea pedis of both feet B35.3    Benign essential HTN I10    Mixed hyperlipidemia E78.2    Onychomycosis B35.1    Diabetic polyneuropathy associated with type 2 diabetes mellitus ([facility] [address] E11.42    Type 2 diabetes mellitus with hyperglycemia, with long-term current use of insulin ([facility]) E11.65, Z79.4           Plan:     Type 2 diabetes mellitus, uncontrolled:  Hemoglobin A1c is more than 14% on 12-, 12.8% on 2-. Fingerstick blood glucose is 195 mg/dL in my office today. Up to date with diabetes related annual labs: no 12-  Up to date with diabetic eye exam: Yes sometime in 2021  Plan:  Discussed with patient again importance of controlling diabetes to prevent endorgan damage. Discussed again about diabetic diet. Increase Lantus to 25 units at bedtime, continue metformin  mg 2 tablets with dinner and pioglitazone 45 mg daily. Check blood glucose twice a day every day and bring glucometer to next visit in 3 months. Essential hypertension:  Patient has microalbuminuria, last checked in December 2020. Blood pressure is elevated today. Continue amlodipine 10 mg daily, increase lisinopril from 5 mg to 10 mg daily. Mixed hyperlipidemia:  12-: Total cholesterol 202, triglycerides 89, . Patient was started on atorvastatin 10 mg at bedtime. 12-: Total cholesterol 214, triglycerides 77, . Increase atorvastatin to 20 mg daily, take it at bedtime, work on low-fat diet. Tinea pedis:  Treated and resolved. Orders Placed This Encounter    AMB POC GLUCOSE BLOOD, BY GLUCOSE MONITORING DEVICE    atorvastatin (LIPITOR) 20 mg tablet     Sig: Take 1 Tablet by mouth nightly for 90 days. Dispense:  90 Tablet     Refill:  2     DC atorvastatin 10 mg    lisinopriL (PRINIVIL, ZESTRIL) 10 mg tablet     Sig: Take 1 Tablet by mouth daily for 90 days.      Dispense: 90 Tablet     Refill:  2    insulin glargine (LANTUS,BASAGLAR) 100 unit/mL (3 mL) inpn     Sig: Inject 25 units at bedtime, 90 days     Dispense:  27 mL     Refill:  2    Insulin Needles, Disposable, 31 gauge x 5/16\" ndle     Sig: Once a day     Dispense:  100 Each     Refill:  2    metFORMIN ER (GLUCOPHAGE XR) 500 mg tablet     Si tabs with dinner, 90 days     Dispense:  180 Tablet     Refill:  2    pioglitazone (ACTOS) 45 mg tablet     Sig: Take 1 Tablet by mouth daily for 90 days.      Dispense:  90 Tablet     Refill:  2    glucose blood VI test strips (Accu-Chek Dayna Plus test strp) strip     Sig: Check glucose twice a day, 90 days     Dispense:  300 Strip     Refill:  3    lancets (Accu-Chek Softclix Lancets) misc     Sig: Check glucose twice a day, 90 days     Dispense:  300 Each     Refill:  3        Signed By: Tapan Marshall MD     2022      Return to clinic 3 months

## 2022-01-11 NOTE — LETTER
1/11/2022    Patient: Yan Seay   YOB: 1970   Date of Visit: 1/11/2022     Mayelin Bolanos MD  [address] [facility] [address]  Via Fax: [phone]    Dear Mayelin Bolanos MD,      Thank you for referring Mr. Ashley Boyle to [address] [place] [address] for evaluation. My notes for this consultation are attached. If you have questions, please do not hesitate to call me. I look forward to following your patient along with you.       Sincerely,    Norris Bo MD

## 2022-01-21 ENCOUNTER — TELEPHONE (OUTPATIENT)
Dept: ENDOCRINOLOGY | Age: 52
End: 2022-01-21

## 2022-01-21 NOTE — TELEPHONE ENCOUNTER
Patient called in stated that since you increase his insulin it is making his stomach cramp and hurt and it is keeping him from going to work.  He wants to know what he needs to do to keep his stomach from hurting

## 2022-01-21 NOTE — TELEPHONE ENCOUNTER
Spoke to patient. Sugars running from 90s to low 100s. Discussed with patient that stomach pain is not because of insulin. Advised patient to talk to PCP about this. Patient expressed understanding.

## 2022-01-24 ENCOUNTER — TELEPHONE (OUTPATIENT)
Dept: ENDOCRINOLOGY | Age: 52
End: 2022-01-24

## 2022-01-24 DIAGNOSIS — E11.65 TYPE 2 DIABETES MELLITUS WITH HYPERGLYCEMIA, WITH LONG-TERM CURRENT USE OF INSULIN (HCC): Primary | ICD-10-CM

## 2022-01-24 DIAGNOSIS — Z79.4 TYPE 2 DIABETES MELLITUS WITH HYPERGLYCEMIA, WITH LONG-TERM CURRENT USE OF INSULIN (HCC): Primary | ICD-10-CM

## 2022-01-24 RX ORDER — INSULIN GLARGINE 100 [IU]/ML
INJECTION, SOLUTION SUBCUTANEOUS
Qty: 9 ML | Refills: 3 | Status: SHIPPED | OUTPATIENT
Start: 2022-01-24

## 2022-01-24 NOTE — TELEPHONE ENCOUNTER
Pt called in stating that the Lantus will cost him $900 and he would like to be switched back on his previous insulin if that's possible

## 2022-03-18 PROBLEM — B35.3 TINEA PEDIS OF BOTH FEET: Status: ACTIVE | Noted: 2020-12-18

## 2022-03-19 PROBLEM — E78.2 MIXED HYPERLIPIDEMIA: Status: ACTIVE | Noted: 2020-12-18

## 2022-03-19 PROBLEM — E11.42 DIABETIC POLYNEUROPATHY ASSOCIATED WITH TYPE 2 DIABETES MELLITUS (HCC): Status: ACTIVE | Noted: 2021-02-16

## 2022-03-19 PROBLEM — B35.1 ONYCHOMYCOSIS: Status: ACTIVE | Noted: 2021-02-16

## 2022-03-19 PROBLEM — Z79.4 TYPE 2 DIABETES MELLITUS WITH HYPERGLYCEMIA, WITH LONG-TERM CURRENT USE OF INSULIN (HCC): Status: ACTIVE | Noted: 2022-01-11

## 2022-03-19 PROBLEM — I10 BENIGN ESSENTIAL HTN: Status: ACTIVE | Noted: 2020-12-18

## 2022-03-19 PROBLEM — E11.65 TYPE 2 DIABETES MELLITUS WITH HYPERGLYCEMIA, WITH LONG-TERM CURRENT USE OF INSULIN (HCC): Status: ACTIVE | Noted: 2022-01-11

## 2022-03-19 PROBLEM — E11.65 TYPE 2 DIABETES MELLITUS WITH HYPERGLYCEMIA, WITHOUT LONG-TERM CURRENT USE OF INSULIN (HCC): Status: ACTIVE | Noted: 2020-12-18

## 2022-05-02 RX ORDER — DULOXETIN HYDROCHLORIDE 60 MG/1
CAPSULE, DELAYED RELEASE ORAL
Qty: 90 CAPSULE | Refills: 2 | Status: SHIPPED | OUTPATIENT
Start: 2022-05-02

## 2022-10-05 NOTE — TELEPHONE ENCOUNTER
Patient called said that his insurance is not covering his insulin. He wanted to know if you could prescribe another insulin that his insurance will cover. DISPLAY PLAN FREE TEXT

## 2023-05-20 RX ORDER — AMLODIPINE BESYLATE 10 MG/1
TABLET ORAL
COMMUNITY
Start: 2021-12-23

## 2023-05-20 RX ORDER — CLOTRIMAZOLE AND BETAMETHASONE DIPROPIONATE 10; .64 MG/G; MG/G
CREAM TOPICAL 2 TIMES DAILY
COMMUNITY
Start: 2021-02-16

## 2023-05-20 RX ORDER — DULOXETIN HYDROCHLORIDE 60 MG/1
1 CAPSULE, DELAYED RELEASE ORAL DAILY
COMMUNITY
Start: 2022-05-02

## 2023-05-20 RX ORDER — METFORMIN HYDROCHLORIDE 500 MG/1
TABLET, EXTENDED RELEASE ORAL
COMMUNITY
Start: 2021-03-15

## 2023-05-20 RX ORDER — INSULIN GLARGINE 100 [IU]/ML
INJECTION, SOLUTION SUBCUTANEOUS
COMMUNITY
Start: 2022-01-24